# Patient Record
Sex: MALE | Race: WHITE | NOT HISPANIC OR LATINO | URBAN - METROPOLITAN AREA
[De-identification: names, ages, dates, MRNs, and addresses within clinical notes are randomized per-mention and may not be internally consistent; named-entity substitution may affect disease eponyms.]

---

## 2024-02-23 ENCOUNTER — HOSPITAL ENCOUNTER (OUTPATIENT)
Facility: HOSPITAL | Age: 44
Discharge: HOME | End: 2024-02-24
Attending: EMERGENCY MEDICINE | Admitting: STUDENT IN AN ORGANIZED HEALTH CARE EDUCATION/TRAINING PROGRAM
Payer: MEDICAID

## 2024-02-23 DIAGNOSIS — Z72.0 TOBACCO USE: ICD-10-CM

## 2024-02-23 DIAGNOSIS — I21.4 NSTEMI (NON-ST ELEVATION MYOCARDIAL INFARCTION): ICD-10-CM

## 2024-02-23 DIAGNOSIS — R07.9 CHEST PAIN: ICD-10-CM

## 2024-02-23 DIAGNOSIS — I21.4 NON-ST ELEVATION MYOCARDIAL INFARCTION (NSTEMI): ICD-10-CM

## 2024-02-23 DIAGNOSIS — R07.9 CHEST PAIN WITH HIGH RISK FOR CARDIAC ETIOLOGY: Primary | ICD-10-CM

## 2024-02-23 PROCEDURE — 99285 EMERGENCY DEPT VISIT HI MDM: CPT

## 2024-02-23 PROCEDURE — 85025 COMPLETE CBC W/AUTO DIFF WBC: CPT | Performed by: EMERGENCY MEDICINE

## 2024-02-23 PROCEDURE — 85610 PROTHROMBIN TIME: CPT | Performed by: EMERGENCY MEDICINE

## 2024-02-23 PROCEDURE — 84484 ASSAY OF TROPONIN QUANT: CPT | Performed by: EMERGENCY MEDICINE

## 2024-02-23 PROCEDURE — 85730 THROMBOPLASTIN TIME PARTIAL: CPT | Performed by: EMERGENCY MEDICINE

## 2024-02-23 PROCEDURE — 80053 COMPREHEN METABOLIC PANEL: CPT | Performed by: EMERGENCY MEDICINE

## 2024-02-24 VITALS
TEMPERATURE: 98 F | RESPIRATION RATE: 16 BRPM | HEART RATE: 68 BPM | OXYGEN SATURATION: 97 % | WEIGHT: 175 LBS | SYSTOLIC BLOOD PRESSURE: 131 MMHG | BODY MASS INDEX: 22.46 KG/M2 | HEIGHT: 74 IN | DIASTOLIC BLOOD PRESSURE: 83 MMHG

## 2024-02-24 PROBLEM — R07.9 CHEST PAIN WITH HIGH RISK FOR CARDIAC ETIOLOGY: Status: ACTIVE | Noted: 2024-02-24

## 2024-02-24 PROBLEM — I25.2 HISTORY OF NON-ST ELEVATION MYOCARDIAL INFARCTION (NSTEMI): Status: ACTIVE | Noted: 2024-02-24

## 2024-02-24 PROBLEM — Z72.0 TOBACCO ABUSE: Chronic | Status: ACTIVE | Noted: 2024-02-24

## 2024-02-24 PROBLEM — J44.9 COPD (CHRONIC OBSTRUCTIVE PULMONARY DISEASE): Chronic | Status: ACTIVE | Noted: 2024-02-24

## 2024-02-24 PROBLEM — I25.10 CAD IN NATIVE ARTERY: Status: ACTIVE | Noted: 2024-02-24

## 2024-02-24 PROBLEM — I10 ESSENTIAL HYPERTENSION: Chronic | Status: ACTIVE | Noted: 2024-02-24

## 2024-02-24 LAB
ABO + RH BLD: NORMAL
ALBUMIN SERPL BCP-MCNC: 3.6 G/DL (ref 3.5–5.2)
ALP SERPL-CCNC: 104 U/L (ref 55–135)
ALT SERPL W/O P-5'-P-CCNC: 14 U/L (ref 10–44)
ANION GAP SERPL CALC-SCNC: 8 MMOL/L (ref 8–16)
ANION GAP SERPL CALC-SCNC: 9 MMOL/L (ref 8–16)
AORTIC ROOT ANNULUS: 3.59 CM
APTT PPP: 25.9 SEC (ref 21–32)
ASCENDING AORTA: 3.27 CM
AST SERPL-CCNC: 16 U/L (ref 10–40)
AV INDEX (PROSTH): 0.8
AV MEAN GRADIENT: 4 MMHG
AV PEAK GRADIENT: 8 MMHG
AV VALVE AREA BY VELOCITY RATIO: 3.17 CM²
AV VALVE AREA: 3.42 CM²
AV VELOCITY RATIO: 0.74
BASOPHILS # BLD AUTO: 0.02 K/UL (ref 0–0.2)
BASOPHILS NFR BLD: 0.3 % (ref 0–1.9)
BILIRUB SERPL-MCNC: 0.1 MG/DL (ref 0.1–1)
BLD GP AB SCN CELLS X3 SERPL QL: NORMAL
BSA FOR ECHO PROCEDURE: 2.04 M2
BUN SERPL-MCNC: 13 MG/DL (ref 6–20)
BUN SERPL-MCNC: 13 MG/DL (ref 6–20)
CALCIUM SERPL-MCNC: 8.9 MG/DL (ref 8.7–10.5)
CALCIUM SERPL-MCNC: 9.2 MG/DL (ref 8.7–10.5)
CHLORIDE SERPL-SCNC: 104 MMOL/L (ref 95–110)
CHLORIDE SERPL-SCNC: 106 MMOL/L (ref 95–110)
CHOLEST SERPL-MCNC: 155 MG/DL (ref 120–199)
CHOLEST/HDLC SERPL: 5.2 {RATIO} (ref 2–5)
CO2 SERPL-SCNC: 24 MMOL/L (ref 23–29)
CO2 SERPL-SCNC: 25 MMOL/L (ref 23–29)
CREAT SERPL-MCNC: 0.8 MG/DL (ref 0.5–1.4)
CREAT SERPL-MCNC: 0.8 MG/DL (ref 0.5–1.4)
CV ECHO LV RWT: 0.43 CM
DIFFERENTIAL METHOD BLD: ABNORMAL
DOP CALC AO PEAK VEL: 1.41 M/S
DOP CALC AO VTI: 28.5 CM
DOP CALC LVOT AREA: 4.3 CM2
DOP CALC LVOT DIAMETER: 2.33 CM
DOP CALC LVOT PEAK VEL: 1.05 M/S
DOP CALC LVOT STROKE VOLUME: 97.59 CM3
DOP CALC RVOT PEAK VEL: 0.74 M/S
DOP CALC RVOT VTI: 17.5 CM
DOP CALCLVOT PEAK VEL VTI: 22.9 CM
E WAVE DECELERATION TIME: 180.81 MSEC
E/A RATIO: 1.28
E/E' RATIO: 7.4 M/S
ECHO LV POSTERIOR WALL: 1.16 CM (ref 0.6–1.1)
EOSINOPHIL # BLD AUTO: 0.3 K/UL (ref 0–0.5)
EOSINOPHIL NFR BLD: 3.8 % (ref 0–8)
ERYTHROCYTE [DISTWIDTH] IN BLOOD BY AUTOMATED COUNT: 12.7 % (ref 11.5–14.5)
EST. GFR  (NO RACE VARIABLE): >60 ML/MIN/1.73 M^2
EST. GFR  (NO RACE VARIABLE): >60 ML/MIN/1.73 M^2
ESTIMATED AVG GLUCOSE: 100 MG/DL (ref 68–131)
FRACTIONAL SHORTENING: 29 % (ref 28–44)
GLUCOSE SERPL-MCNC: 103 MG/DL (ref 70–110)
GLUCOSE SERPL-MCNC: 98 MG/DL (ref 70–110)
HBA1C MFR BLD: 5.1 % (ref 4–5.6)
HCT VFR BLD AUTO: 35.2 % (ref 40–54)
HDLC SERPL-MCNC: 30 MG/DL (ref 40–75)
HDLC SERPL: 19.4 % (ref 20–50)
HGB BLD-MCNC: 12.3 G/DL (ref 14–18)
IMM GRANULOCYTES # BLD AUTO: 0.02 K/UL (ref 0–0.04)
IMM GRANULOCYTES NFR BLD AUTO: 0.3 % (ref 0–0.5)
INR PPP: 1 (ref 0.8–1.2)
INTERVENTRICULAR SEPTUM: 1.37 CM (ref 0.6–1.1)
IVC DIAMETER: 2.43 CM
IVRT: 88.49 MSEC
LA MAJOR: 6.09 CM
LA MINOR: 6.23 CM
LA WIDTH: 3.6 CM
LDLC SERPL CALC-MCNC: 102 MG/DL (ref 63–159)
LEFT ATRIUM SIZE: 4.27 CM
LEFT ATRIUM VOLUME INDEX: 39.3 ML/M2
LEFT ATRIUM VOLUME: 80.48 CM3
LEFT INTERNAL DIMENSION IN SYSTOLE: 3.79 CM (ref 2.1–4)
LEFT VENTRICLE DIASTOLIC VOLUME INDEX: 67.12 ML/M2
LEFT VENTRICLE DIASTOLIC VOLUME: 137.6 ML
LEFT VENTRICLE MASS INDEX: 136 G/M2
LEFT VENTRICLE SYSTOLIC VOLUME INDEX: 30 ML/M2
LEFT VENTRICLE SYSTOLIC VOLUME: 61.44 ML
LEFT VENTRICULAR INTERNAL DIMENSION IN DIASTOLE: 5.34 CM (ref 3.5–6)
LEFT VENTRICULAR MASS: 279.46 G
LV LATERAL E/E' RATIO: 6.17 M/S
LV SEPTAL E/E' RATIO: 9.25 M/S
LVOT MG: 2.31 MMHG
LVOT MV: 0.7 CM/S
LYMPHOCYTES # BLD AUTO: 2.7 K/UL (ref 1–4.8)
LYMPHOCYTES NFR BLD: 36.9 % (ref 18–48)
MAGNESIUM SERPL-MCNC: 2.1 MG/DL (ref 1.6–2.6)
MCH RBC QN AUTO: 33.2 PG (ref 27–31)
MCHC RBC AUTO-ENTMCNC: 34.9 G/DL (ref 32–36)
MCV RBC AUTO: 95 FL (ref 82–98)
MONOCYTES # BLD AUTO: 0.6 K/UL (ref 0.3–1)
MONOCYTES NFR BLD: 8.6 % (ref 4–15)
MV PEAK A VEL: 0.58 M/S
MV PEAK E VEL: 0.74 M/S
MV STENOSIS PRESSURE HALF TIME: 52.43 MS
MV VALVE AREA P 1/2 METHOD: 4.2 CM2
NEUTROPHILS # BLD AUTO: 3.7 K/UL (ref 1.8–7.7)
NEUTROPHILS NFR BLD: 50.1 % (ref 38–73)
NONHDLC SERPL-MCNC: 125 MG/DL
NRBC BLD-RTO: 0 /100 WBC
OHS QRS DURATION: 106 MS
OHS QTC CALCULATION: 457 MS
PISA TR MAX VEL: 2.23 M/S
PLATELET # BLD AUTO: 243 K/UL (ref 150–450)
PMV BLD AUTO: 10.4 FL (ref 9.2–12.9)
POTASSIUM SERPL-SCNC: 4 MMOL/L (ref 3.5–5.1)
POTASSIUM SERPL-SCNC: 4.2 MMOL/L (ref 3.5–5.1)
PROT SERPL-MCNC: 6.7 G/DL (ref 6–8.4)
PROTHROMBIN TIME: 10.7 SEC (ref 9–12.5)
PULM VEIN S/D RATIO: 1.56
PV MEAN GRADIENT: 1 MMHG
PV MV: 0.7 M/S
PV PEAK D VEL: 0.34 M/S
PV PEAK GRADIENT: 4 MMHG
PV PEAK S VEL: 0.53 M/S
PV PEAK VELOCITY: 1 M/S
RA MAJOR: 5.4 CM
RA PRESSURE ESTIMATED: 8 MMHG
RA WIDTH: 4.4 CM
RBC # BLD AUTO: 3.7 M/UL (ref 4.6–6.2)
RIGHT VENTRICULAR END-DIASTOLIC DIMENSION: 3.87 CM
RV TB RVSP: 10 MMHG
RV TISSUE DOPPLER FREE WALL SYSTOLIC VELOCITY 1 (APICAL 4 CHAMBER VIEW): 17.88 CM/S
SODIUM SERPL-SCNC: 138 MMOL/L (ref 136–145)
SODIUM SERPL-SCNC: 138 MMOL/L (ref 136–145)
SPECIMEN OUTDATE: NORMAL
STJ: 2.72 CM
TDI LATERAL: 0.12 M/S
TDI SEPTAL: 0.08 M/S
TDI: 0.1 M/S
TR MAX PG: 20 MMHG
TRICUSPID ANNULAR PLANE SYSTOLIC EXCURSION: 1.84 CM
TRIGL SERPL-MCNC: 115 MG/DL (ref 30–150)
TROPONIN I SERPL DL<=0.01 NG/ML-MCNC: <0.006 NG/ML (ref 0–0.03)
TV REST PULMONARY ARTERY PRESSURE: 28 MMHG
WBC # BLD AUTO: 7.31 K/UL (ref 3.9–12.7)
Z-SCORE OF LEFT VENTRICULAR DIMENSION IN END DIASTOLE: -1.41
Z-SCORE OF LEFT VENTRICULAR DIMENSION IN END SYSTOLE: 0.05

## 2024-02-24 PROCEDURE — 25000003 PHARM REV CODE 250: Performed by: STUDENT IN AN ORGANIZED HEALTH CARE EDUCATION/TRAINING PROGRAM

## 2024-02-24 PROCEDURE — 86901 BLOOD TYPING SEROLOGIC RH(D): CPT | Performed by: STUDENT IN AN ORGANIZED HEALTH CARE EDUCATION/TRAINING PROGRAM

## 2024-02-24 PROCEDURE — 93005 ELECTROCARDIOGRAM TRACING: CPT

## 2024-02-24 PROCEDURE — 36415 COLL VENOUS BLD VENIPUNCTURE: CPT | Performed by: STUDENT IN AN ORGANIZED HEALTH CARE EDUCATION/TRAINING PROGRAM

## 2024-02-24 PROCEDURE — 93010 ELECTROCARDIOGRAM REPORT: CPT | Mod: ,,, | Performed by: INTERNAL MEDICINE

## 2024-02-24 PROCEDURE — G0378 HOSPITAL OBSERVATION PER HR: HCPCS

## 2024-02-24 PROCEDURE — 25000003 PHARM REV CODE 250: Performed by: INTERNAL MEDICINE

## 2024-02-24 PROCEDURE — 80048 BASIC METABOLIC PNL TOTAL CA: CPT | Performed by: STUDENT IN AN ORGANIZED HEALTH CARE EDUCATION/TRAINING PROGRAM

## 2024-02-24 PROCEDURE — 84484 ASSAY OF TROPONIN QUANT: CPT | Mod: 91 | Performed by: STUDENT IN AN ORGANIZED HEALTH CARE EDUCATION/TRAINING PROGRAM

## 2024-02-24 PROCEDURE — 83036 HEMOGLOBIN GLYCOSYLATED A1C: CPT | Performed by: STUDENT IN AN ORGANIZED HEALTH CARE EDUCATION/TRAINING PROGRAM

## 2024-02-24 PROCEDURE — 99215 OFFICE O/P EST HI 40 MIN: CPT | Mod: 25,,, | Performed by: INTERNAL MEDICINE

## 2024-02-24 PROCEDURE — 83735 ASSAY OF MAGNESIUM: CPT | Performed by: STUDENT IN AN ORGANIZED HEALTH CARE EDUCATION/TRAINING PROGRAM

## 2024-02-24 PROCEDURE — 80061 LIPID PANEL: CPT | Performed by: STUDENT IN AN ORGANIZED HEALTH CARE EDUCATION/TRAINING PROGRAM

## 2024-02-24 RX ORDER — NAPROXEN SODIUM 220 MG/1
81 TABLET, FILM COATED ORAL DAILY
Status: DISCONTINUED | OUTPATIENT
Start: 2024-02-25 | End: 2024-02-24 | Stop reason: HOSPADM

## 2024-02-24 RX ORDER — NITROGLYCERIN 0.4 MG/1
0.4 TABLET SUBLINGUAL EVERY 5 MIN PRN
Qty: 25 TABLET | Refills: 1 | Status: SHIPPED | OUTPATIENT
Start: 2024-02-24 | End: 2024-02-24

## 2024-02-24 RX ORDER — CARVEDILOL 6.25 MG/1
6.25 TABLET ORAL 2 TIMES DAILY WITH MEALS
Qty: 60 TABLET | Refills: 1 | Status: SHIPPED | OUTPATIENT
Start: 2024-02-24 | End: 2025-02-23

## 2024-02-24 RX ORDER — HYDROXYZINE HYDROCHLORIDE 25 MG/1
25 TABLET, FILM COATED ORAL 3 TIMES DAILY PRN
Status: DISCONTINUED | OUTPATIENT
Start: 2024-02-24 | End: 2024-02-24 | Stop reason: HOSPADM

## 2024-02-24 RX ORDER — OXYCODONE HYDROCHLORIDE 5 MG/1
5 TABLET ORAL EVERY 6 HOURS PRN
Status: DISCONTINUED | OUTPATIENT
Start: 2024-02-24 | End: 2024-02-24 | Stop reason: HOSPADM

## 2024-02-24 RX ORDER — KETOROLAC TROMETHAMINE 30 MG/ML
30 INJECTION, SOLUTION INTRAMUSCULAR; INTRAVENOUS EVERY 6 HOURS PRN
Status: DISCONTINUED | OUTPATIENT
Start: 2024-02-24 | End: 2024-02-24 | Stop reason: HOSPADM

## 2024-02-24 RX ORDER — ISOSORBIDE MONONITRATE 30 MG/1
30 TABLET, EXTENDED RELEASE ORAL DAILY
Qty: 30 TABLET | Refills: 1 | Status: SHIPPED | OUTPATIENT
Start: 2024-02-25 | End: 2025-02-24

## 2024-02-24 RX ORDER — CARVEDILOL 6.25 MG/1
6.25 TABLET ORAL 2 TIMES DAILY WITH MEALS
Qty: 60 TABLET | Refills: 1 | Status: SHIPPED | OUTPATIENT
Start: 2024-02-24 | End: 2024-02-24

## 2024-02-24 RX ORDER — ACETAMINOPHEN 325 MG/1
650 TABLET ORAL EVERY 6 HOURS PRN
Status: DISCONTINUED | OUTPATIENT
Start: 2024-02-24 | End: 2024-02-24 | Stop reason: HOSPADM

## 2024-02-24 RX ORDER — ESCITALOPRAM OXALATE 5 MG/1
5 TABLET ORAL DAILY
Status: DISCONTINUED | OUTPATIENT
Start: 2024-02-24 | End: 2024-02-24 | Stop reason: HOSPADM

## 2024-02-24 RX ORDER — NITROGLYCERIN 0.4 MG/1
0.4 TABLET SUBLINGUAL EVERY 5 MIN PRN
Qty: 25 TABLET | Refills: 1 | Status: SHIPPED | OUTPATIENT
Start: 2024-02-24 | End: 2024-03-25

## 2024-02-24 RX ORDER — ISOSORBIDE MONONITRATE 30 MG/1
30 TABLET, EXTENDED RELEASE ORAL DAILY
Qty: 30 TABLET | Refills: 1 | Status: SHIPPED | OUTPATIENT
Start: 2024-02-25 | End: 2024-02-24

## 2024-02-24 RX ORDER — ATORVASTATIN CALCIUM 40 MG/1
80 TABLET, FILM COATED ORAL DAILY
Status: DISCONTINUED | OUTPATIENT
Start: 2024-02-24 | End: 2024-02-24 | Stop reason: HOSPADM

## 2024-02-24 RX ORDER — HYDRALAZINE HYDROCHLORIDE 20 MG/ML
10 INJECTION INTRAMUSCULAR; INTRAVENOUS EVERY 8 HOURS PRN
Status: DISCONTINUED | OUTPATIENT
Start: 2024-02-24 | End: 2024-02-24 | Stop reason: HOSPADM

## 2024-02-24 RX ORDER — NAPROXEN SODIUM 220 MG/1
324 TABLET, FILM COATED ORAL ONCE
Status: COMPLETED | OUTPATIENT
Start: 2024-02-24 | End: 2024-02-24

## 2024-02-24 RX ORDER — CARVEDILOL 3.12 MG/1
6.25 TABLET ORAL 2 TIMES DAILY WITH MEALS
Status: DISCONTINUED | OUTPATIENT
Start: 2024-02-24 | End: 2024-02-24 | Stop reason: HOSPADM

## 2024-02-24 RX ORDER — ONDANSETRON HYDROCHLORIDE 2 MG/ML
8 INJECTION, SOLUTION INTRAVENOUS EVERY 6 HOURS PRN
Status: DISCONTINUED | OUTPATIENT
Start: 2024-02-24 | End: 2024-02-24 | Stop reason: HOSPADM

## 2024-02-24 RX ORDER — NAPROXEN SODIUM 220 MG/1
81 TABLET, FILM COATED ORAL DAILY
Qty: 30 TABLET | Refills: 1 | Status: SHIPPED | OUTPATIENT
Start: 2024-02-25 | End: 2025-02-24

## 2024-02-24 RX ORDER — NITROGLYCERIN 0.4 MG/1
0.4 TABLET SUBLINGUAL EVERY 5 MIN PRN
Status: DISCONTINUED | OUTPATIENT
Start: 2024-02-24 | End: 2024-02-24 | Stop reason: HOSPADM

## 2024-02-24 RX ORDER — ISOSORBIDE MONONITRATE 30 MG/1
30 TABLET, EXTENDED RELEASE ORAL DAILY
Status: DISCONTINUED | OUTPATIENT
Start: 2024-02-24 | End: 2024-02-24 | Stop reason: HOSPADM

## 2024-02-24 RX ORDER — ATORVASTATIN CALCIUM 80 MG/1
80 TABLET, FILM COATED ORAL DAILY
Qty: 90 TABLET | Refills: 0 | Status: SHIPPED | OUTPATIENT
Start: 2024-02-25 | End: 2024-02-24

## 2024-02-24 RX ORDER — NAPROXEN SODIUM 220 MG/1
81 TABLET, FILM COATED ORAL DAILY
Qty: 30 TABLET | Refills: 1 | Status: SHIPPED | OUTPATIENT
Start: 2024-02-25 | End: 2024-02-24

## 2024-02-24 RX ORDER — ATORVASTATIN CALCIUM 80 MG/1
80 TABLET, FILM COATED ORAL DAILY
Qty: 90 TABLET | Refills: 0 | Status: SHIPPED | OUTPATIENT
Start: 2024-02-25 | End: 2025-02-24

## 2024-02-24 RX ORDER — LABETALOL HYDROCHLORIDE 5 MG/ML
10 INJECTION, SOLUTION INTRAVENOUS EVERY 6 HOURS PRN
Status: DISCONTINUED | OUTPATIENT
Start: 2024-02-24 | End: 2024-02-24 | Stop reason: HOSPADM

## 2024-02-24 RX ADMIN — ATORVASTATIN CALCIUM 80 MG: 40 TABLET, FILM COATED ORAL at 08:02

## 2024-02-24 RX ADMIN — ISOSORBIDE MONONITRATE 30 MG: 30 TABLET, EXTENDED RELEASE ORAL at 01:02

## 2024-02-24 RX ADMIN — ASPIRIN 81 MG CHEWABLE TABLET 324 MG: 81 TABLET CHEWABLE at 04:02

## 2024-02-24 RX ADMIN — ESCITALOPRAM 5 MG: 5 TABLET, FILM COATED ORAL at 08:02

## 2024-02-24 RX ADMIN — CARVEDILOL 6.25 MG: 3.12 TABLET, FILM COATED ORAL at 08:02

## 2024-02-24 NOTE — SUBJECTIVE & OBJECTIVE
Past Medical History  Significant coronary artery disease   Essential hypertension   Mixed hyperlipidemia   Generalized anxiety disorder   Continuous tobacco abuse   COPD    No past surgical history on file.    Review of patient's allergies indicates:  No Known Allergies    No current facility-administered medications on file prior to encounter.     No current outpatient medications on file prior to encounter.     Family History    None       Tobacco Use    Smoking status: Not on file    Smokeless tobacco: Not on file   Substance and Sexual Activity    Alcohol use: Not on file    Drug use: Not on file    Sexual activity: Not on file     Review of Systems   Constitutional:  Negative for chills, diaphoresis and fever.   HENT:  Negative for congestion, postnasal drip, rhinorrhea, sore throat and trouble swallowing.    Eyes:  Negative for pain, redness, itching and visual disturbance.   Respiratory:  Positive for chest tightness. Negative for cough, shortness of breath and wheezing.    Cardiovascular:  Positive for chest pain. Negative for palpitations.   Gastrointestinal:  Negative for abdominal distention, abdominal pain, constipation, diarrhea, nausea and vomiting.   Genitourinary:  Negative for difficulty urinating, dysuria and frequency.   Musculoskeletal:  Negative for arthralgias, back pain and joint swelling.   Neurological:  Negative for dizziness, seizures, syncope, weakness, light-headedness and headaches.   Psychiatric/Behavioral:  Negative for agitation, behavioral problems, confusion and suicidal ideas.      Objective:     Vital Signs (Most Recent):  Temp: 97.9 °F (36.6 °C) (02/24/24 0347)  Pulse: 67 (02/24/24 0347)  Resp: 19 (02/24/24 0347)  BP: 131/84 (02/24/24 0347)  SpO2: 98 % (02/24/24 0347) Vital Signs (24h Range):  Temp:  [97.9 °F (36.6 °C)] 97.9 °F (36.6 °C)  Pulse:  [67-97] 67  Resp:  [17-19] 19  SpO2:  [96 %-98 %] 98 %  BP: (123-143)/(84-89) 131/84     Weight: 79.4 kg (175 lb)  Body mass index is  "22.47 kg/m².     Physical Exam  Constitutional:       Appearance: He is normal weight.   HENT:      Head: Normocephalic and atraumatic.   Eyes:      Extraocular Movements: Extraocular movements intact.      Conjunctiva/sclera: Conjunctivae normal.      Pupils: Pupils are equal, round, and reactive to light.   Cardiovascular:      Rate and Rhythm: Normal rate and regular rhythm.      Pulses: Normal pulses.      Heart sounds: No murmur heard.  Pulmonary:      Effort: Pulmonary effort is normal. No respiratory distress.      Breath sounds: Normal breath sounds.   Abdominal:      General: Abdomen is flat. There is no distension.      Palpations: Abdomen is soft.   Neurological:      General: No focal deficit present.      Mental Status: He is alert and oriented to person, place, and time. Mental status is at baseline.   Psychiatric:         Mood and Affect: Mood normal.         Behavior: Behavior normal.              CRANIAL NERVES     CN III, IV, VI   Pupils are equal, round, and reactive to light.       Significant Labs: BMP:   Recent Labs   Lab 02/23/24 2357         K 4.0      CO2 25   BUN 13   CREATININE 0.8   CALCIUM 9.2     CBC:   Recent Labs   Lab 02/23/24 2357   WBC 7.31   HGB 12.3*   HCT 35.2*        CMP:   Recent Labs   Lab 02/23/24 2357      K 4.0      CO2 25      BUN 13   CREATININE 0.8   CALCIUM 9.2   PROT 6.7   ALBUMIN 3.6   BILITOT 0.1   ALKPHOS 104   AST 16   ALT 14   ANIONGAP 9     Cardiac Markers: No results for input(s): "CKMB", "MYOGLOBIN", "BNP", "TROPISTAT" in the last 48 hours.  Coagulation:   Recent Labs   Lab 02/23/24 2357   INR 1.0   APTT 25.9     Lactic Acid: No results for input(s): "LACTATE" in the last 48 hours.  Magnesium: No results for input(s): "MG" in the last 48 hours.  Troponin:   Recent Labs   Lab 02/23/24 2357   TROPONINI <0.006         Significant Imaging:   Imaging Results              X-Ray Chest AP Portable (Final result)  " Result time 02/24/24 00:22:10      Final result by Guerrero Baker MD (02/24/24 00:22:10)                   Impression:      No convincing radiographic evidence of acute intrathoracic process on this single view.      Electronically signed by: Guerrero Baker MD  Date:    02/24/2024  Time:    00:22               Narrative:    EXAMINATION:  XR CHEST AP PORTABLE    CLINICAL HISTORY:  chest pain;    TECHNIQUE:  Single frontal view of the chest was performed.    COMPARISON:  None    FINDINGS:  Cardiac monitoring leads overlie the chest.  Cardiac silhouette is not significantly enlarged.  No confluent airspace consolidation identified throughout the lungs.  No significant volume of pleural fluid or pneumothorax appreciated.  Visualized osseous structures appear intact.

## 2024-02-24 NOTE — ED NOTES
Pt states he does not have a ride home. Transportation requested. Pt is in the lobby at this time. Instructed that I will keep him updated.

## 2024-02-24 NOTE — ASSESSMENT & PLAN NOTE
--home medications include:  Coreg  --restart as tolerated   --PRN IV hydralazine 10mg Q6H for sBP > 175 mmHg  --Q4HVS

## 2024-02-24 NOTE — ASSESSMENT & PLAN NOTE
--stable on room air, not on home O2  --Monitor clinically for respiratory distress, Q4H O2 sats   --Encourage light physical activity to maintain lung function   --follows pulmonology OP, f/u at discharge

## 2024-02-24 NOTE — ASSESSMENT & PLAN NOTE
High risk, significant risk factors include prior CAD with MI, significant tobacco abuse.  Symptoms also seem to improve after nitro dose  --s/p  and nitro dose x 1 in the ER  --asymptomatic, no active chest pain at this time  --EKG: negative for ischemia  --Trop NEG x 1   --PRN nitro for chest pain  --statin and antiplatelet therapy   --cardiac diet, cardiac monitoring

## 2024-02-24 NOTE — ASSESSMENT & PLAN NOTE
--smokes 1/2 ppd  --cessation counseling ordered, to be done prior to discharge  --nicotine patch contraindicated in the setting of ACS

## 2024-02-24 NOTE — H&P
"  [facility]  [facility]  History & Physical    Patient Name: Alexandru Sanchez  MRN: [id number]  Patient Class: OP- Observation  Admission Date: 2/23/2024  Attending Physician: Frank Comer MD   Primary Care Provider: No primary care provider on file.         Patient information was obtained from patient, past medical records, and ER records.     Subjective:     Principal Problem:Chest pain with high risk for cardiac etiology    Chief Complaint:   Chief Complaint   Patient presents with    Chest Pain     Pt c/o chest pain radiating to L arm and neck, denies sob or N/V. Started 1 hour ago. Pain 7/10 prior to nitro, 0/10 post nitro        HPI: Alexandru Sanchez, a 43-year-old gentleman with a past medical history of significant coronary artery disease, essential hypertension, mixed hyperlipidemia, generalized anxiety disorder, continuous tobacco abuse, and COPD, presented to the ED for evaluation of chest pain that began gradually 1.5 hours prior. The patient described the pain as a midsternal "sharp pressure" radiating into his left neck and left upper extremity, constant and moderate in severity, accompanied by diaphoresis and left hand numbness. He related the symptoms to a previous myocardial infarction he experienced 2.5 years ago, for which he underwent heart catheterization at [facility] after initial evaluation at [facility]. The patient denied shortness of breath, nausea/vomiting, leg swelling, and other symptoms. He reported discontinuation of daily aspirin therapy but had taken BC Powder containing aspirin at symptom onset. En route to the hospital, he received 325 mg of aspirin, three sprays of nitroglycerin, and fentanyl, which reduced his pain to 1 out of 10. He is an active smoker and denied recent illicit drug use.    Upon arrival in the ER, initial vital signs were within normal limits, and the patient remained hemodynamically stable. Routine laboratory tests were largely within " acceptable limits, with pertinent negatives including a normal troponin level on one occasion. The chest X-ray was negative for any acute cardiopulmonary disease. EKG demonstrated a normal sinus rhythm without obvious signs of ischemia. Hospital Medicine was called for admission.    Past Medical History  Significant coronary artery disease   Essential hypertension   Mixed hyperlipidemia   Generalized anxiety disorder   Continuous tobacco abuse   COPD    No past surgical history on file.    Review of patient's allergies indicates:  No Known Allergies    No current facility-administered medications on file prior to encounter.     No current outpatient medications on file prior to encounter.     Family History    None       Tobacco Use    Smoking status: Not on file    Smokeless tobacco: Not on file   Substance and Sexual Activity    Alcohol use: Not on file    Drug use: Not on file    Sexual activity: Not on file     Review of Systems   Constitutional:  Negative for chills, diaphoresis and fever.   HENT:  Negative for congestion, postnasal drip, rhinorrhea, sore throat and trouble swallowing.    Eyes:  Negative for pain, redness, itching and visual disturbance.   Respiratory:  Positive for chest tightness. Negative for cough, shortness of breath and wheezing.    Cardiovascular:  Positive for chest pain. Negative for palpitations.   Gastrointestinal:  Negative for abdominal distention, abdominal pain, constipation, diarrhea, nausea and vomiting.   Genitourinary:  Negative for difficulty urinating, dysuria and frequency.   Musculoskeletal:  Negative for arthralgias, back pain and joint swelling.   Neurological:  Negative for dizziness, seizures, syncope, weakness, light-headedness and headaches.   Psychiatric/Behavioral:  Negative for agitation, behavioral problems, confusion and suicidal ideas.      Objective:     Vital Signs (Most Recent):  Temp: 97.9 °F (36.6 °C) (02/24/24 0347)  Pulse: 67 (02/24/24 0347)  Resp: 19  "(02/24/24 0347)  BP: 131/84 (02/24/24 0347)  SpO2: 98 % (02/24/24 0347) Vital Signs (24h Range):  Temp:  [97.9 °F (36.6 °C)] 97.9 °F (36.6 °C)  Pulse:  [67-97] 67  Resp:  [17-19] 19  SpO2:  [96 %-98 %] 98 %  BP: (123-143)/(84-89) 131/84     Weight: 79.4 kg (175 lb)  Body mass index is 22.47 kg/m².     Physical Exam  Constitutional:       Appearance: He is normal weight.   HENT:      Head: Normocephalic and atraumatic.   Eyes:      Extraocular Movements: Extraocular movements intact.      Conjunctiva/sclera: Conjunctivae normal.      Pupils: Pupils are equal, round, and reactive to light.   Cardiovascular:      Rate and Rhythm: Normal rate and regular rhythm.      Pulses: Normal pulses.      Heart sounds: No murmur heard.  Pulmonary:      Effort: Pulmonary effort is normal. No respiratory distress.      Breath sounds: Normal breath sounds.   Abdominal:      General: Abdomen is flat. There is no distension.      Palpations: Abdomen is soft.   Neurological:      General: No focal deficit present.      Mental Status: He is alert and oriented to person, place, and time. Mental status is at baseline.   Psychiatric:         Mood and Affect: Mood normal.         Behavior: Behavior normal.              CRANIAL NERVES     CN III, IV, VI   Pupils are equal, round, and reactive to light.       Significant Labs: BMP:   Recent Labs   Lab 02/23/24 2357         K 4.0      CO2 25   BUN 13   CREATININE 0.8   CALCIUM 9.2     CBC:   Recent Labs   Lab 02/23/24 2357   WBC 7.31   HGB 12.3*   HCT 35.2*        CMP:   Recent Labs   Lab 02/23/24 2357      K 4.0      CO2 25      BUN 13   CREATININE 0.8   CALCIUM 9.2   PROT 6.7   ALBUMIN 3.6   BILITOT 0.1   ALKPHOS 104   AST 16   ALT 14   ANIONGAP 9     Cardiac Markers: No results for input(s): "CKMB", "MYOGLOBIN", "BNP", "TROPISTAT" in the last 48 hours.  Coagulation:   Recent Labs   Lab 02/23/24  2357   INR 1.0   APTT 25.9     Lactic Acid: No " "results for input(s): "LACTATE" in the last 48 hours.  Magnesium: No results for input(s): "MG" in the last 48 hours.  Troponin:   Recent Labs   Lab 02/23/24  2357   TROPONINI <0.006         Significant Imaging:   Imaging Results              X-Ray Chest AP Portable (Final result)  Result time 02/24/24 00:22:10      Final result by Guerrero Baker MD (02/24/24 00:22:10)                   Impression:      No convincing radiographic evidence of acute intrathoracic process on this single view.      Electronically signed by: Guerrero Baker MD  Date:    02/24/2024  Time:    00:22               Narrative:    EXAMINATION:  XR CHEST AP PORTABLE    CLINICAL HISTORY:  chest pain;    TECHNIQUE:  Single frontal view of the chest was performed.    COMPARISON:  None    FINDINGS:  Cardiac monitoring leads overlie the chest.  Cardiac silhouette is not significantly enlarged.  No confluent airspace consolidation identified throughout the lungs.  No significant volume of pleural fluid or pneumothorax appreciated.  Visualized osseous structures appear intact.                                      Assessment/Plan:     * Chest pain with high risk for cardiac etiology  High risk, significant risk factors include prior CAD with MI, significant tobacco abuse.  Symptoms also seem to improve after nitro dose  --s/p  and nitro dose x 1 in the ER  --asymptomatic, no active chest pain at this time  --EKG: negative for ischemia  --Trop NEG x 1   --PRN nitro for chest pain  --statin and antiplatelet therapy   --cardiac diet, cardiac monitoring      Essential hypertension  --home medications include:  Coreg  --restart as tolerated   --PRN IV hydralazine 10mg Q6H for sBP > 175 mmHg  --Q4HVS    COPD (chronic obstructive pulmonary disease)  --stable on room air, not on home O2  --Monitor clinically for respiratory distress, Q4H O2 sats   --Encourage light physical activity to maintain lung function   --follows pulmonology OP, f/u at " discharge      Tobacco abuse  --smokes 1/2 ppd  --cessation counseling ordered, to be done prior to discharge  --nicotine patch contraindicated in the setting of ACS          VTE Risk Mitigation (From admission, onward)           Ordered     IP VTE LOW RISK PATIENT  Once         02/24/24 0323     Place sequential compression device  Until discontinued         02/24/24 0323                       On 02/24/2024, patient should be placed in hospital observation services under my care.             Frank Comer MD  Department of Hospital Medicine  [facility] - Emergency Dept.

## 2024-02-24 NOTE — SUBJECTIVE & OBJECTIVE
sNo past medical history on file.    No past surgical history on file.    Review of patient's allergies indicates:  No Known Allergies    No current facility-administered medications on file prior to encounter.     No current outpatient medications on file prior to encounter.     Family History    None       Tobacco Use    Smoking status: Not on file    Smokeless tobacco: Not on file   Substance and Sexual Activity    Alcohol use: Not on file    Drug use: Not on file    Sexual activity: Not on file     Review of Systems   Constitutional: Negative.   HENT: Negative.     Eyes: Negative.    Cardiovascular:  Positive for chest pain.   Respiratory: Negative.     Endocrine: Negative.    Hematologic/Lymphatic: Negative.    Skin: Negative.    Musculoskeletal: Negative.    Gastrointestinal: Negative.    Genitourinary: Negative.    Neurological: Negative.    Psychiatric/Behavioral: Negative.     Allergic/Immunologic: Negative.      Objective:     Vital Signs (Most Recent):  Temp: 97.9 °F (36.6 °C) (02/24/24 0627)  Pulse: 72 (02/24/24 1001)  Resp: 19 (02/24/24 1001)  BP: 130/79 (02/24/24 1001)  SpO2: 97 % (02/24/24 1001) Vital Signs (24h Range):  Temp:  [97.9 °F (36.6 °C)] 97.9 °F (36.6 °C)  Pulse:  [62-97] 72  Resp:  [17-20] 19  SpO2:  [96 %-99 %] 97 %  BP: (121-152)/(79-93) 130/79     Weight: 79.4 kg (175 lb)  Body mass index is 22.47 kg/m².    SpO2: 97 %         Intake/Output Summary (Last 24 hours) at 2/24/2024 1024  Last data filed at 2/24/2024 0751  Gross per 24 hour   Intake --   Output 1000 ml   Net -1000 ml       Lines/Drains/Airways       Peripheral Intravenous Line  Duration                  Peripheral IV - Single Lumen 02/23/24 2245 18 G Left Antecubital <1 day                     Physical Exam  Vitals and nursing note reviewed.   Constitutional:       Appearance: He is well-developed.   HENT:      Head: Normocephalic.   Neck:      Thyroid: No thyromegaly.      Vascular: Normal carotid pulses. No carotid bruit,  "hepatojugular reflux or JVD.   Cardiovascular:      Rate and Rhythm: Normal rate and regular rhythm.      Chest Wall: PMI is not displaced.      Pulses:           Radial pulses are 2+ on the right side and 2+ on the left side.      Heart sounds: S1 normal and S2 normal. Heart sounds not distant. No midsystolic click and no opening snap. No murmur heard.     No friction rub. No S3 or S4 sounds.   Pulmonary:      Effort: Pulmonary effort is normal.      Breath sounds: Normal breath sounds. No wheezing or rales.   Abdominal:      General: Bowel sounds are normal. There is no distension or abdominal bruit.      Palpations: Abdomen is soft. There is no mass.      Tenderness: There is no abdominal tenderness.   Musculoskeletal:      Cervical back: Normal range of motion and neck supple.   Skin:     General: Skin is warm.   Neurological:      Mental Status: He is alert and oriented to person, place, and time.   Psychiatric:         Behavior: Behavior normal.          Significant Labs: ABG: No results for input(s): "PH", "PCO2", "HCO3", "POCSATURATED", "BE" in the last 48 hours., Blood Culture: No results for input(s): "LABBLOO" in the last 48 hours., BMP:   Recent Labs   Lab 02/23/24 2357 02/24/24  0332    98    138   K 4.0 4.2    106   CO2 25 24   BUN 13 13   CREATININE 0.8 0.8   CALCIUM 9.2 8.9   MG  --  2.1   , CMP   Recent Labs   Lab 02/23/24 2357 02/24/24  0332    138   K 4.0 4.2    106   CO2 25 24    98   BUN 13 13   CREATININE 0.8 0.8   CALCIUM 9.2 8.9   PROT 6.7  --    ALBUMIN 3.6  --    BILITOT 0.1  --    ALKPHOS 104  --    AST 16  --    ALT 14  --    ANIONGAP 9 8   , CBC   Recent Labs   Lab 02/23/24 2357   WBC 7.31   HGB 12.3*   HCT 35.2*      , INR   Recent Labs   Lab 02/23/24 2357   INR 1.0   , Lipid Panel No results for input(s): "CHOL", "HDL", "LDLCALC", "TRIG", "CHOLHDL" in the last 48 hours., and Troponin   Recent Labs   Lab 02/23/24  3392 02/24/24  6327 " 02/24/24  0814   TROPONINI <0.006 <0.006 <0.006       Significant Imaging: Echocardiogram: Transthoracic echo (TTE) complete (Cupid Only): No results found for this or any previous visit.

## 2024-02-24 NOTE — HPI
"Alexandru Sanchez, a 43-year-old gentleman with a past medical history of significant coronary artery disease, essential hypertension, mixed hyperlipidemia, generalized anxiety disorder, continuous tobacco abuse, and COPD, presented to the ED for evaluation of chest pain that began gradually 1.5 hours prior. The patient described the pain as a midsternal "sharp pressure" radiating into his left neck and left upper extremity, constant and moderate in severity, accompanied by diaphoresis and left hand numbness. He related the symptoms to a previous myocardial infarction he experienced 2.5 years ago, for which he underwent heart catheterization at [facility] after initial evaluation at [facility]. The patient denied shortness of breath, nausea/vomiting, leg swelling, and other symptoms. He reported discontinuation of daily aspirin therapy but had taken BC Powder containing aspirin at symptom onset. En route to the hospital, he received 325 mg of aspirin, three sprays of nitroglycerin, and fentanyl, which reduced his pain to 1 out of 10. He is an active smoker and denied recent illicit drug use.    Upon arrival in the ER, initial vital signs were within normal limits, and the patient remained hemodynamically stable. Routine laboratory tests were largely within acceptable limits, with pertinent negatives including a normal troponin level on one occasion. The chest X-ray was negative for any acute cardiopulmonary disease. EKG demonstrated a normal sinus rhythm without obvious signs of ischemia. Hospital Medicine was called for admission.  "

## 2024-02-24 NOTE — ED PROVIDER NOTES
"SCRIBE #1 NOTE: I, Miguel Quach, am scribing for, and in the presence of, Hira Pereira MD. I have scribed the entire note.       History     Chief Complaint   Patient presents with    Chest Pain     Pt c/o chest pain radiating to L arm and neck, denies sob or N/V. Started 1 hour ago. Pain 7/10 prior to nitro, 0/10 post nitro     Review of patient's allergies indicates:  No Known Allergies      History of Present Illness     HPI    2/24/2024, 12:20 AM  History obtained from the patient      History of Present Illness: Alexandru Sanchez is a 43 y.o. male patient who presents to the Emergency Department for evaluation of CP which onset gradually 1.5 hours ago. Patient states he started having a midsternal CP that he describes as a "sharp pressure" that radiates into his left neck and LUE.  Symptoms are constant and moderate in severity. No mitigating or exacerbating factors reported. Associated sxs include diaphoresis and left hand numbness. He reports his symptoms were similar to when he had a previous MI 2.5 years ago. He states he was evaluated at [facility] but was transferred to [facility] for the heart catheterization. Patient denies any SOB, N/V, leg swelling, and all other sxs at this time. Patient was on daily ASA but stopped taking it. He notes he took BC Powder that contained ASA at the time of onset. Patient also received 325 ASA, 3 sprays of NTG, and fentanyl en route via EMS and rates his pain to be a 1 out of 10 at this time. No further complaints or concerns at this time. Patient is an active smoker and denies any recent drug use.       Arrival mode: EMS      PCP: No primary care provider on file.        Past Medical History:  No past medical history on file.    Past Surgical History:  No past surgical history on file.      Family History:  No family history on file.    Social History:  Social History     Tobacco Use    Smoking status: Not on file    Smokeless tobacco: Not on file   Substance and " "Sexual Activity    Alcohol use: Not on file    Drug use: Not on file    Sexual activity: Not on file        Review of Systems     Review of Systems   Constitutional:  Positive for diaphoresis. Negative for fever.   HENT:  Negative for sore throat.    Respiratory:  Negative for shortness of breath.    Cardiovascular:  Positive for chest pain ("sharp pressure"). Negative for leg swelling.   Gastrointestinal:  Negative for nausea and vomiting.   Genitourinary:  Negative for dysuria.   Musculoskeletal:  Negative for back pain.   Skin:  Negative for rash.   Neurological:  Positive for numbness (left hand). Negative for weakness.   Hematological:  Does not bruise/bleed easily.   All other systems reviewed and are negative.       Physical Exam     Initial Vitals [02/23/24 2318]   BP Pulse Resp Temp SpO2   (!) 143/89 97 18 97.9 °F (36.6 °C) 96 %      MAP       --          Physical Exam   Nursing Notes and Vital Signs Reviewed.  Constitutional: Patient is in no acute distress. Well-developed and well-nourished.  Head: Atraumatic. Normocephalic.  Eyes: PERRL. EOM intact. Conjunctivae are not pale. No scleral icterus.  ENT: Mucous membranes are moist.   Neck: Supple. Full ROM. No lymphadenopathy.  Cardiovascular: Regular rate. Regular rhythm. No murmurs, rubs, or gallops. Distal pulses are 2+ and symmetric.  Pulmonary/Chest: No respiratory distress. Clear to auscultation bilaterally. No wheezing or rales.  Abdominal: Soft and non-distended.  There is no tenderness.  No rebound, guarding, or rigidity.  Musculoskeletal: Moves all extremities. No obvious deformities. No edema. No unilateral leg with discrepancy.  Skin: Warm and dry.  Neurological:  Alert, awake, and appropriate.  Normal speech.  No acute focal neurological deficits are appreciated.  Psychiatric: Normal affect. Good eye contact. Appropriate in content.     ED Course   Procedures  ED Vital Signs:  Vitals:    02/23/24 2318 02/23/24 2333 02/24/24 0100 02/24/24 0200 " "  BP: (!) 143/89 127/86  123/84   Pulse: 97 78  69   Resp: 18 17 19 18   Temp: 97.9 °F (36.6 °C) 97.9 °F (36.6 °C)  97.9 °F (36.6 °C)   TempSrc: Oral      SpO2: 96% 98%  98%   Weight: 79.4 kg (175 lb)      Height: 6' 2" (1.88 m)          Abnormal Lab Results:  Labs Reviewed   CBC W/ AUTO DIFFERENTIAL - Abnormal; Notable for the following components:       Result Value    RBC 3.70 (*)     Hemoglobin 12.3 (*)     Hematocrit 35.2 (*)     MCH 33.2 (*)     All other components within normal limits   COMPREHENSIVE METABOLIC PANEL   TROPONIN I   PROTIME-INR   APTT        All Lab Results:  Results for orders placed or performed during the hospital encounter of 02/23/24   CBC auto differential   Result Value Ref Range    WBC 7.31 3.90 - 12.70 K/uL    RBC 3.70 (L) 4.60 - 6.20 M/uL    Hemoglobin 12.3 (L) 14.0 - 18.0 g/dL    Hematocrit 35.2 (L) 40.0 - 54.0 %    MCV 95 82 - 98 fL    MCH 33.2 (H) 27.0 - 31.0 pg    MCHC 34.9 32.0 - 36.0 g/dL    RDW 12.7 11.5 - 14.5 %    Platelets 243 150 - 450 K/uL    MPV 10.4 9.2 - 12.9 fL    Immature Granulocytes 0.3 0.0 - 0.5 %    Gran # (ANC) 3.7 1.8 - 7.7 K/uL    Immature Grans (Abs) 0.02 0.00 - 0.04 K/uL    Lymph # 2.7 1.0 - 4.8 K/uL    Mono # 0.6 0.3 - 1.0 K/uL    Eos # 0.3 0.0 - 0.5 K/uL    Baso # 0.02 0.00 - 0.20 K/uL    nRBC 0 0 /100 WBC    Gran % 50.1 38.0 - 73.0 %    Lymph % 36.9 18.0 - 48.0 %    Mono % 8.6 4.0 - 15.0 %    Eosinophil % 3.8 0.0 - 8.0 %    Basophil % 0.3 0.0 - 1.9 %    Differential Method Automated    Comprehensive metabolic panel   Result Value Ref Range    Sodium 138 136 - 145 mmol/L    Potassium 4.0 3.5 - 5.1 mmol/L    Chloride 104 95 - 110 mmol/L    CO2 25 23 - 29 mmol/L    Glucose 103 70 - 110 mg/dL    BUN 13 6 - 20 mg/dL    Creatinine 0.8 0.5 - 1.4 mg/dL    Calcium 9.2 8.7 - 10.5 mg/dL    Total Protein 6.7 6.0 - 8.4 g/dL    Albumin 3.6 3.5 - 5.2 g/dL    Total Bilirubin 0.1 0.1 - 1.0 mg/dL    Alkaline Phosphatase 104 55 - 135 U/L    AST 16 10 - 40 U/L    ALT 14 10 - " 44 U/L    eGFR >60 >60 mL/min/1.73 m^2    Anion Gap 9 8 - 16 mmol/L   Troponin I   Result Value Ref Range    Troponin I <0.006 0.000 - 0.026 ng/mL   Protime-INR   Result Value Ref Range    Prothrombin Time 10.7 9.0 - 12.5 sec    INR 1.0 0.8 - 1.2   APTT   Result Value Ref Range    aPTT 25.9 21.0 - 32.0 sec         Imaging Results:  Imaging Results              X-Ray Chest AP Portable (Final result)  Result time 02/24/24 00:22:10      Final result by Guerrero Baker MD (02/24/24 00:22:10)                   Impression:      No convincing radiographic evidence of acute intrathoracic process on this single view.      Electronically signed by: Guerrero Baker MD  Date:    02/24/2024  Time:    00:22               Narrative:    EXAMINATION:  XR CHEST AP PORTABLE    CLINICAL HISTORY:  chest pain;    TECHNIQUE:  Single frontal view of the chest was performed.    COMPARISON:  None    FINDINGS:  Cardiac monitoring leads overlie the chest.  Cardiac silhouette is not significantly enlarged.  No confluent airspace consolidation identified throughout the lungs.  No significant volume of pleural fluid or pneumothorax appreciated.  Visualized osseous structures appear intact.                                       The EKG was ordered, reviewed, and independently interpreted by the ED provider.  Interpretation time: 00:00  Rate: 74 BPM  Rhythm: normal sinus rhythm  Interpretation: No acute ST changes. No STEMI.    The Emergency Provider reviewed the vital signs and test results, which are outlined above.     ED Discussion            Medical Decision Making  Amount and/or Complexity of Data Reviewed  External Data Reviewed: notes.     Details: Past medical history, medications, and allergies reviewed.  Prior hospitalization notes reviewed prior cardiology notes reviewed  Labs: ordered. Decision-making details documented in ED Course.  Radiology: ordered and independent interpretation performed. Decision-making details documented in  ED Course.  ECG/medicine tests: ordered and independent interpretation performed. Decision-making details documented in ED Course.  Discussion of management or test interpretation with external provider(s):     3:02 AM: 44 y/o with chest pain in a high risk patient.  Chest pain radiating to the jaw and his left upper extremity.  This feels similar to his last heart attack that he had at [facility] a few years ago.  Pain resolved after some nitroglycerin. 1st troponin is WNL. Consulted HM for admission to trend troponin and possible Cardiology consult in AM.    3:03 AM: Discussed case with Stacy Solano NP (Hospital Medicine). Dr. Comer agrees with current care and management of pt and accepts admission.   Admitting Service: Hospital Medicine  Admitting Physician: Dr. Comer  Admit to: Obs med tele    3:03 AM: Re-evaluated pt. I have discussed test results, shared treatment plan, and the need for admission with patient and family at bedside. Pt and family express understanding at this time and agree with all information. All questions answered. Pt and family have no further questions or concerns at this time. Pt is ready for admit.      Risk  Prescription drug management.  Parenteral controlled substances.  Decision regarding hospitalization.                 ED Medication(s):  Medications - No data to display    New Prescriptions    No medications on file               Scribe Attestation:   Scribe #1: I performed the above scribed service and the documentation accurately describes the services I performed. I attest to the accuracy of the note.     Attending:   Physician Attestation Statement for Scribe #1: I, Hira Pereira MD, personally performed the services described in this documentation, as scribed by Miguel Quach, in my presence, and it is both accurate and complete.           Clinical Impression       ICD-10-CM ICD-9-CM   1. Chest pain with high risk for cardiac etiology  R07.9 786.50   2. Chest pain  R07.9 786.50    3. Tobacco use  Z72.0 305.1       Disposition:   Disposition: Placed in Observation  Condition: Stable         Hira Pereira MD  03/05/24 1128

## 2024-02-24 NOTE — CONSULTS
[facility] - Emergency Dept.  Cardiology  Consult Note    Patient Name: Alexandru Sanchez  MRN: [id number]  Admission Date: 2/23/2024  Hospital Length of Stay: 0 days  Code Status: Full Code   Attending Provider: Nikolas Granados MD   Consulting Provider: Robert Shelton MD  Primary Care Physician: Cornelia, Primary Doctor  Principal Problem:Chest pain with high risk for cardiac etiology    Patient information was obtained from patient, past medical records, ER records, and primary team.     Inpatient consult to Cardiology  Consult performed by: Robert Shelton MD  Consult ordered by: Alycia Chavez NP  Reason for consult: CHEST PAIN        Subjective:     Chief Complaint:  CHEST PAIN     HPI:   Cardiology consulted for CP.  He has CAD, smoker.  Former drug use.  S/p mild NSTEMI 2020, tx [facility].  Nuclear stress test 2020 inferolateral ischemia.  Followed by [facility] 2020 severe small OM vessel -- med mgt advised due to size, 50% OM1, 50% LAD, EF 45%.  Has not f/u w Cards.  Doesn't take any meds.  States has had CP for long time.  Worse episode so came to ER.  Ecg in ER is normal.  Negative troponin levels x 3.  No CP at time of consult.    sNo past medical history on file.    No past surgical history on file.    Review of patient's allergies indicates:  No Known Allergies    No current facility-administered medications on file prior to encounter.     No current outpatient medications on file prior to encounter.     Family History    None       Tobacco Use    Smoking status: Not on file    Smokeless tobacco: Not on file   Substance and Sexual Activity    Alcohol use: Not on file    Drug use: Not on file    Sexual activity: Not on file     Review of Systems   Constitutional: Negative.   HENT: Negative.     Eyes: Negative.    Cardiovascular:  Positive for chest pain.   Respiratory: Negative.     Endocrine: Negative.    Hematologic/Lymphatic: Negative.    Skin: Negative.    Musculoskeletal: Negative.    Gastrointestinal: Negative.     Genitourinary: Negative.    Neurological: Negative.    Psychiatric/Behavioral: Negative.     Allergic/Immunologic: Negative.      Objective:     Vital Signs (Most Recent):  Temp: 97.9 °F (36.6 °C) (02/24/24 0627)  Pulse: 72 (02/24/24 1001)  Resp: 19 (02/24/24 1001)  BP: 130/79 (02/24/24 1001)  SpO2: 97 % (02/24/24 1001) Vital Signs (24h Range):  Temp:  [97.9 °F (36.6 °C)] 97.9 °F (36.6 °C)  Pulse:  [62-97] 72  Resp:  [17-20] 19  SpO2:  [96 %-99 %] 97 %  BP: (121-152)/(79-93) 130/79     Weight: 79.4 kg (175 lb)  Body mass index is 22.47 kg/m².    SpO2: 97 %         Intake/Output Summary (Last 24 hours) at 2/24/2024 1024  Last data filed at 2/24/2024 0751  Gross per 24 hour   Intake --   Output 1000 ml   Net -1000 ml       Lines/Drains/Airways       Peripheral Intravenous Line  Duration                  Peripheral IV - Single Lumen 02/23/24 2245 18 G Left Antecubital <1 day                     Physical Exam  Vitals and nursing note reviewed.   Constitutional:       Appearance: He is well-developed.   HENT:      Head: Normocephalic.   Neck:      Thyroid: No thyromegaly.      Vascular: Normal carotid pulses. No carotid bruit, hepatojugular reflux or JVD.   Cardiovascular:      Rate and Rhythm: Normal rate and regular rhythm.      Chest Wall: PMI is not displaced.      Pulses:           Radial pulses are 2+ on the right side and 2+ on the left side.      Heart sounds: S1 normal and S2 normal. Heart sounds not distant. No midsystolic click and no opening snap. No murmur heard.     No friction rub. No S3 or S4 sounds.   Pulmonary:      Effort: Pulmonary effort is normal.      Breath sounds: Normal breath sounds. No wheezing or rales.   Abdominal:      General: Bowel sounds are normal. There is no distension or abdominal bruit.      Palpations: Abdomen is soft. There is no mass.      Tenderness: There is no abdominal tenderness.   Musculoskeletal:      Cervical back: Normal range of motion and neck supple.   Skin:      "General: Skin is warm.   Neurological:      Mental Status: He is alert and oriented to person, place, and time.   Psychiatric:         Behavior: Behavior normal.          Significant Labs: ABG: No results for input(s): "PH", "PCO2", "HCO3", "POCSATURATED", "BE" in the last 48 hours., Blood Culture: No results for input(s): "LABBLOO" in the last 48 hours., BMP:   Recent Labs   Lab 02/23/24 2357 02/24/24 0332    98    138   K 4.0 4.2    106   CO2 25 24   BUN 13 13   CREATININE 0.8 0.8   CALCIUM 9.2 8.9   MG  --  2.1   , CMP   Recent Labs   Lab 02/23/24 2357 02/24/24 0332    138   K 4.0 4.2    106   CO2 25 24    98   BUN 13 13   CREATININE 0.8 0.8   CALCIUM 9.2 8.9   PROT 6.7  --    ALBUMIN 3.6  --    BILITOT 0.1  --    ALKPHOS 104  --    AST 16  --    ALT 14  --    ANIONGAP 9 8   , CBC   Recent Labs   Lab 02/23/24 2357   WBC 7.31   HGB 12.3*   HCT 35.2*      , INR   Recent Labs   Lab 02/23/24 2357   INR 1.0   , Lipid Panel No results for input(s): "CHOL", "HDL", "LDLCALC", "TRIG", "CHOLHDL" in the last 48 hours., and Troponin   Recent Labs   Lab 02/23/24 2357 02/24/24 0332 02/24/24  0814   TROPONINI <0.006 <0.006 <0.006       Significant Imaging: Echocardiogram: Transthoracic echo (TTE) complete (Cupid Only): No results found for this or any previous visit.  Assessment and Plan:     * Chest pain with high risk for cardiac etiology  Pt has not been f/u by Cards and has not been on any medical tx for his CAD since his 2020 cath, small NSTEMI.    Recommend improved compliance --- discussed w pt.  OMT advised for CAD.  Add asa, Coreg, Imdur, statin tx.  Will need script for sl ntg prn at discharge.    For now, in light of negative enzymes, normal ECG, would tx w med tx and assess response and compliance with meds in near future.  Nuclear stress test will likely be abnl again.    Will check echo to ensure EF in stable range.  If echo EF stable, 40% EF or greater, can d/c " home with OMT as advised and close f/u with Cards.    If he fails med tx for angina control, will pursue LHC in future.  Discussed risks/benefits LHC/PCI with pt.          Essential hypertension  Coreg for HTN control.    Tobacco abuse  Smoking cessation advised.        VTE Risk Mitigation (From admission, onward)           Ordered     IP VTE LOW RISK PATIENT  Once         02/24/24 0323     Place sequential compression device  Until discontinued         02/24/24 0323                    Thank you for your consult.     Robert Shelton MD  Cardiology   [facility] - Emergency Dept.

## 2024-02-24 NOTE — HPI
Cardiology consulted for CP.  He has CAD, smoker.  Former drug use.  S/p mild NSTEMI 2020, tx [facility].  Nuclear stress test 2020 inferolateral ischemia.  Followed by [facility] 2020 severe small OM vessel -- med mgt advised due to size, 50% OM1, 50% LAD, EF 45%.  Has not f/u w Cards.  Doesn't take any meds.  States has had CP for long time.  Worse episode so came to ER.  Ecg in ER is normal.  Negative troponin levels x 3.  No CP at time of consult.

## 2024-02-24 NOTE — ASSESSMENT & PLAN NOTE
Pt has not been f/u by Cards and has not been on any medical tx for his CAD since his 2020 cath, small NSTEMI.    Recommend improved compliance --- discussed w pt.  OMT advised for CAD.  Add asa, Coreg, Imdur, statin tx.  Will need script for sl ntg prn at discharge.    For now, in light of negative enzymes, normal ECG, would tx w med tx and assess response and compliance with meds in near future.  Nuclear stress test will likely be abnl again.    Will check echo to ensure EF in stable range.  If echo EF stable, 40% EF or greater, can d/c home with OMT as advised and close f/u with Cards.    If he fails med tx for angina control, will pursue LHC in future.  Discussed risks/benefits LHC/PCI with pt.

## 2024-02-25 NOTE — HOSPITAL COURSE
43 year old male, under the care of Hospital Medicine for ACS rule out. Trop remained normal. Evaluated by Cardiology, recommended discharge with OP f/u and for patient to take medications as prescribed and repeat ECHO. ECHO performed, EF improved from last evaluation. Discussed plan with patient, patient in agreement and plans to followed prescribed regimen and to follow-up as recommended. Refills provided for medications. Patient seen and examined on day of discharge and determined stable for discharge.

## 2024-02-25 NOTE — ASSESSMENT & PLAN NOTE
High risk, significant risk factors include prior CAD with MI, significant tobacco abuse.  Symptoms also seem to improve after nitro dose  --s/p  and nitro dose x 1 in the ER  --asymptomatic, no active chest pain at this time  --EKG: negative for ischemia  --Trop NEG x 1   --PRN nitro for chest pain  --statin and antiplatelet therapy   --cardiac diet, cardiac monitoring    --New prescription for Nitroglycerin given to patient, medications ordered as recommended per Cardiology  --Recommend to f/u with Cardiology as OP

## 2024-02-25 NOTE — DISCHARGE SUMMARY
"[facility] Emergency Dept.  Hospital Medicine  Discharge Summary      Patient Name: Alexandru Sanchez  MRN: [id number]  SUKHDEEP: [id number]  Patient Class: OP- Observation  Admission Date: 2/23/2024  Hospital Length of Stay: 0 days  Discharge Date and Time: 2/24/2024  2:02 PM  Attending Physician: Cornelia att. providers found   Discharging Provider: Alycia Chavez NP  Primary Care Provider: Cornelia Primary Doctor    Primary Care Team: Networked reference to record PCT     HPI:   Alexandru Sanchez, a 43-year-old gentleman with a past medical history of significant coronary artery disease, essential hypertension, mixed hyperlipidemia, generalized anxiety disorder, continuous tobacco abuse, and COPD, presented to the ED for evaluation of chest pain that began gradually 1.5 hours prior. The patient described the pain as a midsternal "sharp pressure" radiating into his left neck and left upper extremity, constant and moderate in severity, accompanied by diaphoresis and left hand numbness. He related the symptoms to a previous myocardial infarction he experienced 2.5 years ago, for which he underwent heart catheterization at [facility] after initial evaluation at [facility]. The patient denied shortness of breath, nausea/vomiting, leg swelling, and other symptoms. He reported discontinuation of daily aspirin therapy but had taken BC Powder containing aspirin at symptom onset. En route to the hospital, he received 325 mg of aspirin, three sprays of nitroglycerin, and fentanyl, which reduced his pain to 1 out of 10. He is an active smoker and denied recent illicit drug use.    Upon arrival in the ER, initial vital signs were within normal limits, and the patient remained hemodynamically stable. Routine laboratory tests were largely within acceptable limits, with pertinent negatives including a normal troponin level on one occasion. The chest X-ray was negative for any acute cardiopulmonary disease. EKG demonstrated a normal sinus rhythm " without obvious signs of ischemia. Hospital Medicine was called for admission.    * No surgery found *      Hospital Course:   43 year old male, under the care of Hospital Medicine for ACS rule out. Trop remained normal. Evaluated by Cardiology, recommended discharge with OP f/u and for patient to take medications as prescribed and repeat ECHO. ECHO performed, EF improved from last evaluation. Discussed plan with patient, patient in agreement and plans to followed prescribed regimen and to follow-up as recommended. Refills provided for medications. Patient seen and examined on day of discharge and determined stable for discharge.      Goals of Care Treatment Preferences:  Code Status: Full Code      Consults:   Consults (From admission, onward)          Status Ordering Provider     Inpatient consult to Cardiology  Once        Provider:  Robert Shelotn MD    Completed REECE FRY J.     Inpatient consult to Social Work/Case Management  Once        Provider:  (Not yet assigned)    ESTUARDO Chan            Pulmonary  COPD (chronic obstructive pulmonary disease)    --Encourage light physical activity to maintain lung function   --follows pulmonology OP, f/u at discharge      Cardiac/Vascular  * Chest pain with high risk for cardiac etiology  High risk, significant risk factors include prior CAD with MI, significant tobacco abuse.  Symptoms also seem to improve after nitro dose  --s/p  and nitro dose x 1 in the ER  --asymptomatic, no active chest pain at this time  --EKG: negative for ischemia  --Trop NEG x 1   --PRN nitro for chest pain  --statin and antiplatelet therapy   --cardiac diet, cardiac monitoring    --New prescription for Nitroglycerin given to patient, medications ordered as recommended per Cardiology  --Recommend to f/u with Cardiology as OP        Essential hypertension  --Resume home regimen    Other  Tobacco abuse  --smokes 1/2 ppd  --nicotine patch contraindicated in the  setting of ACS          Final Active Diagnoses:    Diagnosis Date Noted POA    PRINCIPAL PROBLEM:  Chest pain with high risk for cardiac etiology [R07.9] 02/24/2024 Yes    Tobacco abuse [Z72.0] 02/24/2024 Yes     Chronic    COPD (chronic obstructive pulmonary disease) [J44.9] 02/24/2024 Yes     Chronic    Essential hypertension [I10] 02/24/2024 Yes     Chronic    CAD in native artery [I25.10] 02/24/2024 Yes    History of non-ST elevation myocardial infarction (NSTEMI) [I25.2] 02/24/2024 Not Applicable      Problems Resolved During this Admission:       Discharged Condition: stable    Disposition: Home or Self Care    Follow Up:   Follow-up Information       No, Primary Doctor Follow up in 1 week(s).               [facility] - Cardiology Follow up in 1 week(s).    Specialty: Cardiology  Contact information:  [address] [place] [id number] [phone]  Additional information:  Please take [address] to the Medical Office Building. Check in on the 4th floor.                         Patient Instructions:      Ambulatory referral/consult to Cardiology   Standing Status: Future   Referral Priority: Routine Referral Type: Consultation   Referral Reason: Specialty Services Required   Requested Specialty: Cardiology   Number of Visits Requested: 1     Diet Cardiac     Notify your health care provider if you experience any of the following:  temperature >100.4     Notify your health care provider if you experience any of the following:  persistent nausea and vomiting or diarrhea     Notify your health care provider if you experience any of the following:  severe uncontrolled pain     No dressing needed     Activity as tolerated       Significant Diagnostic Studies: Labs: CMP   Recent Labs   Lab 02/23/24  2357 02/24/24  0332    138   K 4.0 4.2    106   CO2 25 24    98   BUN 13 13   CREATININE 0.8 0.8   CALCIUM 9.2 8.9   PROT 6.7  --    ALBUMIN 3.6  --    BILITOT 0.1  --    ALKPHOS  104  --    AST 16  --    ALT 14  --    ANIONGAP 9 8    and CBC   Recent Labs   Lab 02/23/24  2357   WBC 7.31   HGB 12.3*   HCT 35.2*          Pending Diagnostic Studies:       None           Medications:  Reconciled Home Medications:      Medication List        START taking these medications      aspirin 81 MG Chew  Take 1 tablet (81 mg total) by mouth once daily.     atorvastatin 80 MG tablet  Commonly known as: LIPITOR  Take 1 tablet (80 mg total) by mouth once daily.     carvediloL 6.25 MG tablet  Commonly known as: COREG  Take 1 tablet (6.25 mg total) by mouth 2 (two) times daily with meals.     isosorbide mononitrate 30 MG 24 hr tablet  Commonly known as: IMDUR  Take 1 tablet (30 mg total) by mouth once daily.     nitroGLYCERIN 0.4 MG SL tablet  Commonly known as: NITROSTAT  Place 1 tablet (0.4 mg total) under the tongue every 5 (five) minutes as needed for Chest pain (angina).              Indwelling Lines/Drains at time of discharge:   Lines/Drains/Airways       None                   Time spent on the discharge of patient: 45 minutes         Alycia Chavez NP  Department of Hospital Medicine  [facility] Dept.

## 2024-02-25 NOTE — ASSESSMENT & PLAN NOTE
--Encourage light physical activity to maintain lung function   --follows pulmonology OP, f/u at discharge

## 2024-02-27 ENCOUNTER — TELEPHONE (OUTPATIENT)
Dept: CARDIOLOGY | Facility: CLINIC | Age: 44
End: 2024-02-27
Payer: MEDICAID

## 2024-02-27 NOTE — TELEPHONE ENCOUNTER
Attempted to reach patient to schedule appointment with cardiology. Left voice message to call our office back at [phone].